# Patient Record
Sex: FEMALE | Race: WHITE | Employment: OTHER | URBAN - METROPOLITAN AREA
[De-identification: names, ages, dates, MRNs, and addresses within clinical notes are randomized per-mention and may not be internally consistent; named-entity substitution may affect disease eponyms.]

---
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
## 2020-03-02 DIAGNOSIS — K21.00 GASTROESOPHAGEAL REFLUX DISEASE WITH ESOPHAGITIS: ICD-10-CM

## 2020-03-03 NOTE — TELEPHONE ENCOUNTER
omeprazole (PRILOSEC)      Last Written Prescription Date:  2/28/19  Last Fill Quantity: 90,   # refills: 3  Last Office Visit: 3/13/19  Future Office visit:       Routing refill request to provider for review/approval because:  Per pediatric protocol.      Patient last seen in clinic for physical 12/3/18, patient due for annual office visit. Call to patient left voicemail to call clinic.

## 2020-03-05 NOTE — TELEPHONE ENCOUNTER
Tried calling the phone number, [phone], in the chart, but was told that the patient moved from their facility a couple months ago and she did not have other contact information for them.  Contacted pharmacy and to get the contact information for patient and was told that she is now at [facility] at [phone].  Spoke with a care giver there and was told that patient has established care with a provider at [facility].  Rx denied.  Amy Hong RN

## 2020-10-10 DIAGNOSIS — J30.89 PERENNIAL ALLERGIC RHINITIS: ICD-10-CM

## 2020-10-12 RX ORDER — CETIRIZINE HYDROCHLORIDE 10 MG/1
TABLET ORAL
Qty: 28 TABLET | OUTPATIENT
Start: 2020-10-12

## 2020-10-12 NOTE — TELEPHONE ENCOUNTER
Per refill encounter 3/2/20:  Tried calling the phone number, [phone], in the chart, but was told that the patient moved from their facility a couple months ago and she did not have other contact information for them.  Contacted pharmacy and to get the contact information for patient and was told that she is now at [facility] at [phone].  Spoke with a care giver there and was told that patient has established care with a provider at [facility].  Rx denied.    Prescription denied.

## 2024-04-11 ENCOUNTER — DOCUMENTATION ONLY (OUTPATIENT)
Dept: OTHER | Facility: CLINIC | Age: 24
End: 2024-04-11
Payer: COMMERCIAL